# Patient Record
Sex: MALE | Race: WHITE | Employment: FULL TIME | URBAN - METROPOLITAN AREA
[De-identification: names, ages, dates, MRNs, and addresses within clinical notes are randomized per-mention and may not be internally consistent; named-entity substitution may affect disease eponyms.]

---

## 2023-06-20 ENCOUNTER — PROCEDURE VISIT (OUTPATIENT)
Dept: NEUROLOGY | Age: 53
End: 2023-06-20
Payer: COMMERCIAL

## 2023-06-20 DIAGNOSIS — G56.22 ENTRAPMENT OF LEFT ULNAR NERVE AT ELBOW: Primary | ICD-10-CM

## 2023-06-20 PROCEDURE — 95886 MUSC TEST DONE W/N TEST COMP: CPT | Performed by: PSYCHIATRY & NEUROLOGY

## 2023-06-20 PROCEDURE — 95909 NRV CNDJ TST 5-6 STUDIES: CPT | Performed by: PSYCHIATRY & NEUROLOGY

## 2023-06-20 NOTE — PROGRESS NOTES
Leland Harry M.D. [facility]) Physicians/[facility]  Board Certified in [address], [address] [place] [address]    EMG / NERVE CONDUCTION STUDY      PATIENT:  Ashtyn Martinez       DATE OF EM23     YOB: 1970       REASON FOR EMG:   Left arm pain and numbness      REFERRING PHYSICIAN:  Gelacio Flowers DO  [facility]. Mary [facility]itter     SUMMARY:   The left median motor and sensory nerve studies were normal.  The left ulnar motor nerve study had a moderately severe slowing of conduction velocity across the elbow. The left ulnar and radial sensory nerve studies were normal.  Needle EMG of several muscles in the left upper extremity was normal.      CLINICAL DIAGNOSIS:  Ulnar mononeuropathy        EMG RESULTS:     This patient has a moderately severe left ulnar nerve lesion at the elbow.        ---------------------------------------------  Leland Harry M.D.   Electromyographer / Neurologist